# Patient Record
Sex: MALE | Race: OTHER | Employment: FULL TIME | URBAN - METROPOLITAN AREA
[De-identification: names, ages, dates, MRNs, and addresses within clinical notes are randomized per-mention and may not be internally consistent; named-entity substitution may affect disease eponyms.]

---

## 2020-09-22 ENCOUNTER — APPOINTMENT (OUTPATIENT)
Dept: GENERAL RADIOLOGY | Age: 25
End: 2020-09-22
Payer: COMMERCIAL

## 2020-09-22 ENCOUNTER — HOSPITAL ENCOUNTER (EMERGENCY)
Age: 25
Discharge: HOME | End: 2020-09-22
Attending: EMERGENCY MEDICINE
Payer: COMMERCIAL

## 2020-09-22 ENCOUNTER — APPOINTMENT (OUTPATIENT)
Dept: CT IMAGING | Age: 25
End: 2020-09-22
Payer: COMMERCIAL

## 2020-09-22 LAB
ALLEN TEST: ABNORMAL
ANION GAP SERPL CALCULATED.3IONS-SCNC: 12 MMOL/L (ref 9–17)
BLOOD BANK SPECIMEN: ABNORMAL
BUN BLDV-MCNC: 13 MG/DL (ref 6–20)
CARBOXYHEMOGLOBIN: ABNORMAL %
CHLORIDE BLD-SCNC: 98 MMOL/L (ref 98–107)
CO2: 24 MMOL/L (ref 20–31)
CREAT SERPL-MCNC: 0.98 MG/DL (ref 0.7–1.2)
ETHANOL PERCENT: <0.01 %
ETHANOL: <10 MG/DL
FIO2: ABNORMAL
FIO2: ABNORMAL
GFR AFRICAN AMERICAN: >60 ML/MIN
GFR NON-AFRICAN AMERICAN: >60 ML/MIN
GFR SERPL CREATININE-BSD FRML MDRD: ABNORMAL ML/MIN/{1.73_M2}
GFR SERPL CREATININE-BSD FRML MDRD: ABNORMAL ML/MIN/{1.73_M2}
GLUCOSE BLD-MCNC: 104 MG/DL (ref 70–99)
HCG QUALITATIVE: ABNORMAL
HCO3 VENOUS: 22 MMOL/L (ref 24–30)
HCO3 VENOUS: ABNORMAL MMOL/L (ref 24–30)
HCT VFR BLD CALC: 42.1 % (ref 41–53)
HEMOGLOBIN: 14.4 G/DL (ref 13.5–17.5)
INR BLD: 0.9
MCH RBC QN AUTO: 28.1 PG (ref 26–34)
MCHC RBC AUTO-ENTMCNC: 34.1 G/DL (ref 31–37)
MCV RBC AUTO: 82.3 FL (ref 80–100)
METHEMOGLOBIN: ABNORMAL %
MODE: ABNORMAL
NEGATIVE BASE EXCESS, VEN: 3 (ref 0–2)
NEGATIVE BASE EXCESS, VEN: ABNORMAL MMOL/L (ref 0–2)
NOTIFICATION TIME: ABNORMAL
NOTIFICATION: ABNORMAL
NRBC AUTOMATED: ABNORMAL PER 100 WBC
O2 DEVICE/FLOW/%: ABNORMAL
O2 DEVICE/FLOW/%: ABNORMAL
O2 SAT, VEN: 76 %
O2 SAT, VEN: ABNORMAL %
OXYHEMOGLOBIN: ABNORMAL % (ref 95–98)
PARTIAL THROMBOPLASTIN TIME: 23.8 SEC (ref 21.3–31.3)
PATIENT TEMP: ABNORMAL
PATIENT TEMP: ABNORMAL
PCO2, VEN, TEMP ADJ: ABNORMAL MMHG (ref 39–55)
PCO2, VEN: 36 MM HG (ref 39–55)
PCO2, VEN: ABNORMAL (ref 39–55)
PDW BLD-RTO: 12.8 % (ref 12.5–15.4)
PEEP/CPAP: ABNORMAL
PH VENOUS: 7.4 (ref 7.32–7.42)
PH VENOUS: ABNORMAL (ref 7.32–7.42)
PH, VEN, TEMP ADJ: ABNORMAL (ref 7.32–7.42)
PLATELET # BLD: 324 K/UL (ref 140–450)
PMV BLD AUTO: 6.8 FL (ref 6–12)
PO2, VEN, TEMP ADJ: ABNORMAL MMHG (ref 30–50)
PO2, VEN: 40 MM HG (ref 30–50)
PO2, VEN: ABNORMAL (ref 30–50)
POC PCO2 TEMP: ABNORMAL MM HG
POC PH TEMP: ABNORMAL
POC PO2 TEMP: ABNORMAL MM HG
POSITIVE BASE EXCESS, VEN: ABNORMAL (ref 0–2)
POSITIVE BASE EXCESS, VEN: ABNORMAL MMOL/L (ref 0–2)
POTASSIUM SERPL-SCNC: 3.9 MMOL/L (ref 3.7–5.3)
PROTHROMBIN TIME: 9.7 SEC (ref 9.4–12.6)
PSV: ABNORMAL
PT. POSITION: ABNORMAL
RBC # BLD: 5.12 M/UL (ref 4.5–5.9)
RESPIRATORY RATE: ABNORMAL
SAMPLE SITE: ABNORMAL
SET RATE: ABNORMAL
SODIUM BLD-SCNC: 134 MMOL/L (ref 135–144)
TEXT FOR RESPIRATORY: ABNORMAL
TOTAL CO2, VENOUS: 23 MMOL/L (ref 25–31)
TOTAL HB: ABNORMAL G/DL (ref 12–16)
TOTAL RATE: ABNORMAL
VT: ABNORMAL
WBC # BLD: 9.5 K/UL (ref 3.5–11)

## 2020-09-22 PROCEDURE — 73130 X-RAY EXAM OF HAND: CPT

## 2020-09-22 PROCEDURE — 96374 THER/PROPH/DIAG INJ IV PUSH: CPT

## 2020-09-22 PROCEDURE — 85610 PROTHROMBIN TIME: CPT

## 2020-09-22 PROCEDURE — 2580000003 HC RX 258: Performed by: EMERGENCY MEDICINE

## 2020-09-22 PROCEDURE — 99284 EMERGENCY DEPT VISIT MOD MDM: CPT

## 2020-09-22 PROCEDURE — 72125 CT NECK SPINE W/O DYE: CPT

## 2020-09-22 PROCEDURE — 96375 TX/PRO/DX INJ NEW DRUG ADDON: CPT

## 2020-09-22 PROCEDURE — 6360000004 HC RX CONTRAST MEDICATION: Performed by: EMERGENCY MEDICINE

## 2020-09-22 PROCEDURE — 82947 ASSAY GLUCOSE BLOOD QUANT: CPT

## 2020-09-22 PROCEDURE — 84520 ASSAY OF UREA NITROGEN: CPT

## 2020-09-22 PROCEDURE — 80051 ELECTROLYTE PANEL: CPT

## 2020-09-22 PROCEDURE — 71260 CT THORAX DX C+: CPT

## 2020-09-22 PROCEDURE — 84703 CHORIONIC GONADOTROPIN ASSAY: CPT

## 2020-09-22 PROCEDURE — 70450 CT HEAD/BRAIN W/O DYE: CPT

## 2020-09-22 PROCEDURE — 82565 ASSAY OF CREATININE: CPT

## 2020-09-22 PROCEDURE — 85027 COMPLETE CBC AUTOMATED: CPT

## 2020-09-22 PROCEDURE — 73110 X-RAY EXAM OF WRIST: CPT

## 2020-09-22 PROCEDURE — G0480 DRUG TEST DEF 1-7 CLASSES: HCPCS

## 2020-09-22 PROCEDURE — 82803 BLOOD GASES ANY COMBINATION: CPT

## 2020-09-22 PROCEDURE — 70486 CT MAXILLOFACIAL W/O DYE: CPT

## 2020-09-22 PROCEDURE — 36415 COLL VENOUS BLD VENIPUNCTURE: CPT

## 2020-09-22 PROCEDURE — 6360000002 HC RX W HCPCS: Performed by: EMERGENCY MEDICINE

## 2020-09-22 PROCEDURE — 85730 THROMBOPLASTIN TIME PARTIAL: CPT

## 2020-09-22 PROCEDURE — 73562 X-RAY EXAM OF KNEE 3: CPT

## 2020-09-22 PROCEDURE — 82805 BLOOD GASES W/O2 SATURATION: CPT

## 2020-09-22 RX ORDER — ORPHENADRINE CITRATE 30 MG/ML
60 INJECTION INTRAMUSCULAR; INTRAVENOUS ONCE
Status: COMPLETED | OUTPATIENT
Start: 2020-09-22 | End: 2020-09-22

## 2020-09-22 RX ORDER — 0.9 % SODIUM CHLORIDE 0.9 %
80 INTRAVENOUS SOLUTION INTRAVENOUS ONCE
Status: COMPLETED | OUTPATIENT
Start: 2020-09-22 | End: 2020-09-22

## 2020-09-22 RX ORDER — CYCLOBENZAPRINE HCL 5 MG
5 TABLET ORAL 2 TIMES DAILY PRN
Qty: 10 TABLET | Refills: 0 | Status: SHIPPED | OUTPATIENT
Start: 2020-09-22 | End: 2020-10-02

## 2020-09-22 RX ORDER — MORPHINE SULFATE 4 MG/ML
4 INJECTION, SOLUTION INTRAMUSCULAR; INTRAVENOUS ONCE
Status: COMPLETED | OUTPATIENT
Start: 2020-09-22 | End: 2020-09-22

## 2020-09-22 RX ORDER — ONDANSETRON 2 MG/ML
4 INJECTION INTRAMUSCULAR; INTRAVENOUS ONCE
Status: COMPLETED | OUTPATIENT
Start: 2020-09-22 | End: 2020-09-22

## 2020-09-22 RX ORDER — IBUPROFEN 800 MG/1
800 TABLET ORAL EVERY 8 HOURS PRN
Qty: 30 TABLET | Refills: 0 | Status: SHIPPED | OUTPATIENT
Start: 2020-09-22

## 2020-09-22 RX ORDER — KETOROLAC TROMETHAMINE 30 MG/ML
30 INJECTION, SOLUTION INTRAMUSCULAR; INTRAVENOUS ONCE
Status: COMPLETED | OUTPATIENT
Start: 2020-09-22 | End: 2020-09-22

## 2020-09-22 RX ORDER — LIDOCAINE 50 MG/G
1 PATCH TOPICAL DAILY
Qty: 10 PATCH | Refills: 0 | Status: SHIPPED | OUTPATIENT
Start: 2020-09-22

## 2020-09-22 RX ORDER — SODIUM CHLORIDE 0.9 % (FLUSH) 0.9 %
10 SYRINGE (ML) INJECTION PRN
Status: DISCONTINUED | OUTPATIENT
Start: 2020-09-22 | End: 2020-09-23 | Stop reason: HOSPADM

## 2020-09-22 RX ADMIN — ONDANSETRON 4 MG: 2 INJECTION INTRAMUSCULAR; INTRAVENOUS at 19:47

## 2020-09-22 RX ADMIN — Medication 10 ML: at 20:00

## 2020-09-22 RX ADMIN — IOVERSOL 100 ML: 741 INJECTION INTRA-ARTERIAL; INTRAVENOUS at 19:59

## 2020-09-22 RX ADMIN — MORPHINE SULFATE 4 MG: 4 INJECTION INTRAVENOUS at 19:43

## 2020-09-22 RX ADMIN — KETOROLAC TROMETHAMINE 30 MG: 30 INJECTION, SOLUTION INTRAMUSCULAR at 22:02

## 2020-09-22 RX ADMIN — ORPHENADRINE CITRATE 60 MG: 30 INJECTION INTRAMUSCULAR; INTRAVENOUS at 22:02

## 2020-09-22 RX ADMIN — SODIUM CHLORIDE 80 ML: 9 INJECTION, SOLUTION INTRAVENOUS at 20:00

## 2020-09-22 SDOH — HEALTH STABILITY: MENTAL HEALTH: HOW OFTEN DO YOU HAVE A DRINK CONTAINING ALCOHOL?: NEVER

## 2020-09-22 ASSESSMENT — PAIN SCALES - GENERAL
PAINLEVEL_OUTOF10: 10
PAINLEVEL_OUTOF10: 7
PAINLEVEL_OUTOF10: 10
PAINLEVEL_OUTOF10: 10
PAINLEVEL_OUTOF10: 8

## 2020-09-22 ASSESSMENT — PAIN DESCRIPTION - PAIN TYPE: TYPE: ACUTE PAIN
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
## 2020-09-23 VITALS
SYSTOLIC BLOOD PRESSURE: 135 MMHG | HEIGHT: 71 IN | WEIGHT: 197 LBS | OXYGEN SATURATION: 100 % | BODY MASS INDEX: 27.58 KG/M2 | TEMPERATURE: 97.5 F | HEART RATE: 92 BPM | DIASTOLIC BLOOD PRESSURE: 79 MMHG | RESPIRATION RATE: 17 BRPM

## 2020-09-23 NOTE — ED NOTES
Bacitracin ointment applied to abrasions and skin tears (right hand/lower forearm, left wrist). DSD applied. Verbal instructions provided to pt. Pt verbalized understanding.      Facundo Haney RN  09/23/20 4240

## 2020-09-23 NOTE — ED NOTES
Pt cleared for discharge per MD. Pt discharge instructions explained. Prescription ( x 3 ) provided. Pt verbalizes understanding of all instructions and all patient questions answered to their satisfaction. Pt departs from ER alert, ambulatory and in stable condition. Friend is driving pt home.      Winsome Crenshaw RN  09/22/20 4246